# Patient Record
Sex: MALE | Race: OTHER | NOT HISPANIC OR LATINO | URBAN - METROPOLITAN AREA
[De-identification: names, ages, dates, MRNs, and addresses within clinical notes are randomized per-mention and may not be internally consistent; named-entity substitution may affect disease eponyms.]

---

## 2022-03-17 ENCOUNTER — EMERGENCY (EMERGENCY)
Facility: HOSPITAL | Age: 37
LOS: 1 days | Discharge: HOME | End: 2022-03-17
Admitting: EMERGENCY MEDICINE
Payer: COMMERCIAL

## 2022-03-17 VITALS
OXYGEN SATURATION: 99 % | DIASTOLIC BLOOD PRESSURE: 89 MMHG | RESPIRATION RATE: 18 BRPM | HEART RATE: 78 BPM | SYSTOLIC BLOOD PRESSURE: 127 MMHG | TEMPERATURE: 99 F

## 2022-03-17 PROCEDURE — 72100 X-RAY EXAM L-S SPINE 2/3 VWS: CPT | Mod: 26

## 2022-03-17 PROCEDURE — 72110 X-RAY EXAM L-2 SPINE 4/>VWS: CPT | Mod: 26

## 2022-03-17 PROCEDURE — 99284 EMERGENCY DEPT VISIT MOD MDM: CPT | Mod: 25

## 2022-03-17 RX ORDER — IBUPROFEN 200 MG
600 TABLET ORAL ONCE
Refills: 0 | Status: COMPLETED | OUTPATIENT
Start: 2022-03-17 | End: 2022-03-17

## 2022-03-17 RX ADMIN — Medication 600 MILLIGRAM(S): at 20:15
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
## 2022-03-17 NOTE — ED PROVIDER NOTE - PATIENT PORTAL LINK FT
You can access the FollowMyHealth Patient Portal offered by [facility] by registering at the following website: [email or web address]. By joining [employer]’s FollowMyHealth portal, you will also be able to view your health information using other applications (apps) compatible with our system.

## 2022-03-17 NOTE — ED PROVIDER NOTE - NSFOLLOWUPINSTRUCTIONS_ED_ALL_ED_FT
Take Ibuprofen 600mg every 6-8 hours as needed for pain, take with food, and in addition you may take Tylenol 500 mg every 6-8 hours as needed for pain over the counter    Follow up with Orthopedics    Return for any concerning or worsening symptoms.

## 2022-03-17 NOTE — ED ADULT NURSE NOTE - CHPI ED NUR SYMPTOMS NEG
no acting out behaviors/no bruising/no crying/no decreased eating/drinking/no difficulty bearing weight/no disorientation/no dizziness/no headache/no laceration/no loss of consciousness/no neck tenderness/no sleeping issues
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
## 2022-03-17 NOTE — ED PROVIDER NOTE - PHYSICAL EXAMINATION
CONSTITUTIONAL: Well-appearing; well-nourished; in no apparent distress.   	HEAD: Normocephalic; atraumatic.   	EYES:  conjunctiva and sclera clear  	ENT: normal nose; no rhinorrhea; normal pharynx with no erythema or lesions.   	NECK: Supple; non-tender;   	CARDIOVASCULAR: Normal S1, S2; no murmurs, rubs, or gallops. Regular rate and rhythm.   	RESPIRATORY: Breathing easily; breath sounds clear and equal bilaterally; no wheezes, rhonchi, or rales.  	GI: Soft; non-distended; non-tender. no cvat bilaterally  Back: no midline tenderness or bony stepoffs.   	EXT: No cyanosis or edema; N/V intact  TREJO x 4. strength 5/5 x 4. ambulatory.   	SKIN: Normal for age and race; warm; dry; good turgor; no apparent lesions or rash.   	NEURO: A & O x 3; face symmetric; grossly unremarkable.   PSYCHOLOGICAL: The patient’s mood and manner are appropriate.
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
## 2022-03-17 NOTE — ED PROVIDER NOTE - CARE PROVIDER_API CALL
Hermann Hutchison)  Orthopaedic Surgery  [address], [address] [place] [address]  Phone: [phone]  Fax: [phone]  Follow Up Time:
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
## 2022-03-21 DIAGNOSIS — M54.50 LOW BACK PAIN, UNSPECIFIED: ICD-10-CM
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
## 2024-05-16 NOTE — ED ADULT NURSE NOTE - DOES PATIENT HAVE ADVANCE DIRECTIVE
This is a follow-up visit for diabetes disease management.  The patient was last seen on September 15, 2023.  The patient is here for follow-up of type 2 diabetes.    Regarding her type 2 diabetes, another hemoglobin A1c remains well controlled today at 6%.  Her meter memory reveals that she has not consistently checking her blood sugar.  There are only 2 readings within the past 2 weeks which are within the normal range.  Her regimen consists of Jardiance 25 mg daily, metformin  mg 2 tablets daily with breakfast, and Mounjaro 15 mg once weekly.  She is unhappy with her weight and is requesting to switch to Ozempic today.  Her weight is down 1.5 lb from last visit.    Regarding her hypertension, her blood pressure is well controlled today with a reading of 110/70.    Regarding her hyperlipidemia, her most recent fasting lipid panel drawn in September of 2022 revealed elevated triglycerides of 157 and a low HDL of 77.    Patient's past medical history is as follows:  Past Medical History:   Diagnosis Date    Asthma (CMD)     CAD (coronary artery disease)     Cardiomyopathy  (CMD)     Cervical radiculopathy     Cervical radiculopathy     Chronic heart failure  (CMD)     Chronic hip pain     Diabetic neuropathy  (CMD)     DM2 (diabetes mellitus, type 2)  (CMD)     Herniation of intervertebral disc of mid-cervical region     HTN (hypertension)     Hyperlipidemia     Lumbar radiculopathy     Major depressive disorder     Morbid obesity  (CMD)     Spondylosis of lumbosacral region     Tachycardia        Patient's current medications are:  Current Outpatient Medications   Medication Sig    Semaglutide, 2 MG/DOSE, (Ozempic, 2 MG/DOSE,) 8 MG/3ML Solution Pen-injector Inject 2 mg into the skin every 7 days. Indications: Type 2 Diabetes Dx code E11.65    gabapentin (NEURONTIN) 100 MG capsule Take 1 capsule by mouth in the morning and 1 capsule at noon and 1 capsule in the evening.    albuterol 108 (90 Base) MCG/ACT  inhaler Inhale 1 puff into the lungs every four hours as needed for shortness of breath or wheezing    meloxicam (MOBIC) 7.5 MG tablet Take 1 tablet by mouth daily.    traZODone (DESYREL) 100 MG tablet Take 1 tablet by mouth nightly.    carvedilol (COREG) 6.25 MG tablet TAKE ONE TABLET BY MOUTH IN THE MORNING AND ONE tablet IN THE EVENING. Take with meals.]    zolpidem (AMBIEN CR) 12.5 MG CR tablet Take 1 tablet by mouth nightly as needed for Sleep.    budesonide-formoterol (Symbicort) 160-4.5 MCG/ACT inhaler INHALE 2 PUFFS BY MOUTH TWICE DAILY (RINSE MOUTH AFTER EACH USE).    tamsulosin (FLOMAX) 0.4 MG Cap TAKE ONE CAPSULE BY MOUTH nightly    lidocaine (LIDODERM) 5 % patch Remove patch 12 hours after applying    OneTouch Ultra test strip USE TO TEST BLOOD SUGAR TWICE DAILY    metFORMIN (GLUCOPHAGE) 500 MG tablet Take 2 tablets by mouth daily (with breakfast).    ipratropium-albuterol (DUONEB) 0.5-2.5 (3) MG/3ML nebulizer solution Take 3 mLs by nebulization every 6 hours as needed for Wheezing.    amlodipine-benazepril (LOTREL) 2.5-10 MG per capsule AMLODIPINE BESY-BENAZEPRIL HCL 2.5-10 MG CAPS    omeprazole (PriLOSEC OTC) 20 MG tablet PRILOSEC OTC 20 MG TBEC    loratadine (CLARITIN) 10 MG tablet Take 1 tablet by mouth once daily    benzonatate (TESSALON PERLES) 200 MG capsule Take 1 capsule by mouth 3 times daily as needed for Cough.    montelukast (SINGULAIR) 10 MG tablet TAKE ONE TABLET BY MOUTH NIGHTLY    Lancets (Safety Lancet 30G/Pressure Act) Misc 1 each by Other route 2 times daily. Test blood sugars 2 times per day.    Linzess 145 MCG capsule TAKE ONE CAPSULE BY MOUTH ONCE DAILY (Patient not taking: Reported on 5/17/2024)    blood glucose meter Test blood sugar 2 times daily as directed. Diagnosis: Type 2 diabetes with hyperglycemia Meter: one touch verio flex or similar    Lancet Devices (Lancing Device) Misc Use to test blood sugar twice daily. Dx: DM 2. Device: OneTouch.    Alcohol Swabs (Alcohol Prep)  Pads 1 each  in the morning and 1 each in the evening.    oxyCODONE-acetaminophen (PERCOCET)  MG per tablet TAKE 1 TABLET 2-3 TIMES A DAY AS NEEDED    rosuvastatin (CRESTOR) 20 MG tablet Take 1 tablet by mouth daily.    Jardiance 25 MG tablet Take 1 tablet by mouth daily.    furosemide (LASIX) 40 MG tablet Take 1 tablet by mouth daily.    sacubitril-valsartan (Entresto) 24-26 MG per tablet Take 1 tablet by mouth in the morning and 1 tablet in the evening.    cholestyramine (QUESTRAN) 4 g packet Take 1 packet by mouth 3 times daily (with meals) for 5 days.    KIP ASPIRIN PO Take 81 mg by mouth daily.    tiZANidine (ZANAFLEX) 4 MG tablet 4 mg.     No current facility-administered medications for this visit.       Patient's allergies:  ALLERGIES:   Allergen Reactions    Latex PRURITUS and Other (See Comments)     rash  rash    rash    Penicillins PRURITUS and Other (See Comments)     Itching per Patient  Itching per Patient      Latex   (Environmental)      rash         REVIEW OF SYSTEMS:  HEENT: The patient last saw her ophthalmologist on February 13, 2023.  She has had bilateral cataract surgery greater than 10 years ago.  She denies any diabetic eye disease.  She reports that her vision in her right eye is decreased.  She denies any blurry vision, double vision, or floaters.  Regarding dental needs, she has an upper partial.  She was last seen in February of 2024. She denies any painful teeth, loose teeth, or bleeding of gums.  MUSCULOSKELETAL:  The patient does not see a podiatrist.  For exercise she walks her dog 1 hour daily, weather permitting.   NEUROLOGICAL:  The patient does have numbness and tingling of bilateral feet.  She has been taking 100 mg 3 times per day instead of 2 times per day. She reports that it helps.  The patient denies symptoms of hypoglycemia.  ENDOCRINE:  The patient checks their blood sugar 1-2 times per day. The patient uses a One Touch Ultra 2 blood glucose meter.  The patient  had had diabetes education.  The patient does not complain of polydipsia, polyuria, and polyphagia.    PHYSICAL EXAMINATION:  GENERAL:  This is a 66 year old female who appears well.  VITAL SIGNS:  Visit Vitals  /70   Pulse 80   Wt 88 kg (194 lb 0.1 oz)   BMI 29.50 kg/m²        CARDIOVASCULAR:  Heart rate regular rhythm.  S1, S2.  No murmurs or extra sounds.  There is no carotid bruit.  VASCULAR:  Pedal pulses are 3/4+ bilaterally.  There is no edema.  LUNGS:  Lung sounds clear to auscultation throughout.  NEUROLOGICAL:  Position sense and soft touch sense is within normal limits bilaterally.  Monofilament testing is 6/6 bilaterally.  EXTREMITIES:  Toenails are clean and trimmed.  There is no maceration present between toes.  There are no calluses.  SKIN:  Overall warm and dry, no open lesions.    Patient's past surgical history:  Past Surgical History:   Procedure Laterality Date    Cardiac catherization Bilateral 06/29/2023    Carpal tunnel release      Cath place for coronary angiography w md sup - interp graft & rt heart  10/10/2018    Colonoscopy diagnostic  08/19/2021    dr victoria, 5 yr recall    Humerus surgery  01/06/2019    left  ORIF proximal humerus fracture    Myomectomy N/A 1990    Robotic assisted hysterectomy  07/25/2022    Bilateral salpingo-oophorectomy    Rotator cuff repair  01/2019    Shoulder surgery Left     Upper arm/elbow surgery unlisted Left 2020       Patient's family history:  Family History   Problem Relation Age of Onset    Sarcoidosis Mother     Hyperlipidemia Mother     Diabetes Sister     Hypertension Sister     Pacemaker Sister     Patient is unaware of any medical problems Brother     Heart disease Maternal Grandmother     Cancer Maternal Grandfather     Diabetes Paternal Grandmother     Patient is unaware of any medical problems Paternal Grandfather     Patient is unaware of any medical problems Maternal Aunt     Patient is unaware of any medical problems Maternal Uncle      Patient is unaware of any medical problems Paternal Uncle        Current labs: Today's HbA1c is 6%  Hemoglobin A1C (%)   Date Value   02/15/2024 5.7 (H)     No results found for: \"CHOLESTEROL\"  No results found for: \"CALCLDL\"  No results found for: \"HDL\"   No results found for: \"TRIGLYCERIDE\"    Glucose (mg/dL)   Date Value   11/10/2022 142 (A)     Creatinine (mg/dL)   Date Value   11/10/2022 0.83     No results found for: \"GFRA\"  GFR,ESTIMATE (ml/min/1.73sqm)   Date Value   11/10/2022 >60     Potassium (mmol/L)   Date Value   11/10/2022 4.5     GOT/AST (Units/L)   Date Value   01/11/2023 21     GPT/ALT (Units/L)   Date Value   01/11/2023 25     No results found for: \"MALBCR\"  TSH (mcUnits/mL)   Date Value   08/17/2022 1.315      No results found for: \"VITD25\"    ASSESSMENT:  Type 2 diabetes-controlled  Hyperlipidemia- need to reassess    PLAN:  Type 2 diabetes:  Today I switched the patient from Mounjaro 2 Ozempic 2 mg once weekly.  She was able to properly demonstrate use of the pen with a demo pen.  She will continue on current doses of Jardiance and metformin.  I increased her gabapentin to 3 times daily.  Hyperlipidemia:  Lab orders were entered for another fasting lipid panel, TSH, CMP, urine microalbumin, and a vitamin-D level.    I will plan to see her back again in 3 months.    Sury García NP        unk
